# Patient Record
Sex: FEMALE | Race: WHITE | NOT HISPANIC OR LATINO | Employment: UNEMPLOYED | URBAN - METROPOLITAN AREA
[De-identification: names, ages, dates, MRNs, and addresses within clinical notes are randomized per-mention and may not be internally consistent; named-entity substitution may affect disease eponyms.]

---

## 2023-11-15 ENCOUNTER — SOCIAL WORK (OUTPATIENT)
Dept: BEHAVIORAL/MENTAL HEALTH CLINIC | Facility: CLINIC | Age: 28
End: 2023-11-15
Payer: COMMERCIAL

## 2023-11-15 DIAGNOSIS — F41.8 POSTPARTUM ANXIETY: ICD-10-CM

## 2023-11-15 DIAGNOSIS — F39 MOOD DISORDER (HCC): Primary | ICD-10-CM

## 2023-11-15 DIAGNOSIS — F42.2 MIXED OBSESSIONAL THOUGHTS AND ACTS: ICD-10-CM

## 2023-11-15 PROCEDURE — 90834 PSYTX W PT 45 MINUTES: CPT | Performed by: SOCIAL WORKER

## 2023-11-15 NOTE — PSYCH
Behavioral Health Psychotherapy Assessment    Date of Initial Psychotherapy Assessment: 11/15/23  Referral Source: External   Has a release of information been signed for the referral source? NA    Preferred Name: Cele Escalona  Preferred Pronouns: She/her  YOB: 1995 Age: 33 y.o. Sex assigned at birth: female   Gender Identity:   Race:   Preferred Language: English    Emergency Contact:  Full Name:   Relationship to Client:   Contact information:     Primary Care Physician:  No primary care provider on file. No primary provider on file. None  Has a release of information been signed? NA    Physical Health History:  Past surgical procedures:    Do you have a history of any of the following:   Do you have any mobility issues? No    Relevant Family History:  Father  of liver cancer this summer; diagnosed two weeks prior to death. Patient has two children; only with her second birth did she begin experiencing concerns at this degree of intensity. Presenting Problem (What brings you in?)  Anger and dysregulated behavior such as breaking vacuum, shouting while alone, ineffective conflict with spouse that children are viewing, feeling loss of control, handwashing estimated at 50 times daily with belief of edel pinkeye if she does not engage in this, especially following touching anything related to toileting or genital areas on self or on her children     Mental Health Advance Directive:  Do you currently have a [facility] Directive? no    Diagnosis:   Diagnosis ICD-10-CM Associated Orders   1. Mood disorder ([address])  F39       2. Postpartum anxiety  O99.345     F41.8       3.  Mixed obsessional thoughts and acts  F42.2           Initial Assessment:     Current Mental Status:    Appearance: appropriate      Behavior/Manner: cooperative      Affect/Mood:  Anxious, depressed and labile    Speech:  Pressured and loud    Sleep:  Interrupted    Oriented to: oriented to self, oriented to place and oriented to time       Clinical Symptoms    Depression: yes      Anxiety: yes      Depression Symptoms: depressed mood, serious loss of interest in things, social isolation, poor concentration and irritable      Anxiety Symptoms: excessive worry and irritable      Have you ever been assaultive to others or the environment: No      Have you ever been self-injurious: No      Counseling History:  Previous Counseling or Treatment  (Mental Health or Drug & Alcohol): No    Have you previously taken psychiatric medications: No      Suicide Risk Assessment  Have you ever had a suicide attempt: No    Have you had incidents of suicidal ideation: No    Are you currently experiencing suicidal thoughts: No      Substance Abuse/Addiction Assessment:  Alcohol: No    Heroin: No    Fentanyl: No    Opiates: No    Cocaine: No    Amphetamines: No    Hallucinogens: No    Club Drugs: No    Benzodiazepines: No    Other Rx Meds: No    Marijuana: No    Tobacco/Nicotine:  Yes    Amount:  Quit prior to child bearing    Compulsive Behaviors:  Compulsive Behavior Information:  Handwashing; this began after birth of second son     Disordered Eating History:  Do you have a history of disordered eating: Yes    Type of disordered eating: overeating      Social Determinants of Health:    SDOH:  Social isolation and stress    Trauma and Abuse History:    Have you ever been abused: No      Legal History:    Have you ever been arrested  or had a DUI: No      Have you been incarcerated: No      Are you currently on parole/probation: No      Any current Children and Youth involvement: No      Any pending legal charges: No      Relationship History:    Current marital status:       Relationship History:  No current social support     Employment History    Are you currently employed: No      Sources of income/financial support:  Family members     History:      Status: no history of [address] duty  Educational History:     Have you ever been diagnosed with a learning disability: No      Highest level of education:  High school graduate    Have you ever had an IEP or 504-plan: No      Do you need assistance with reading or writing: No      Recommended Treatment:     Psychotherapy:  Individual sessions    Frequency:  1 time    Visit start and stop times: 5:05 p.m. until 5:55 p.m     11/15/23

## 2023-11-16 NOTE — PATIENT INSTRUCTIONS
Patient expressed comfort with CBT as first line intervention with concerns; is aware of opportunity to visit psychiatrist if concerns worsen; if she becomes at risk to herself or others; and/or if CBT intervention is not sufficient